# Patient Record
Sex: FEMALE | Race: WHITE | NOT HISPANIC OR LATINO | Employment: FULL TIME | URBAN - METROPOLITAN AREA
[De-identification: names, ages, dates, MRNs, and addresses within clinical notes are randomized per-mention and may not be internally consistent; named-entity substitution may affect disease eponyms.]

---

## 2017-08-14 ENCOUNTER — OFFICE VISIT (OUTPATIENT)
Dept: URGENT CARE | Facility: PHYSICIAN GROUP | Age: 62
End: 2017-08-14

## 2017-08-14 ENCOUNTER — HOSPITAL ENCOUNTER (OUTPATIENT)
Dept: RADIOLOGY | Facility: MEDICAL CENTER | Age: 62
End: 2017-08-14
Attending: PHYSICIAN ASSISTANT
Payer: COMMERCIAL

## 2017-08-14 VITALS
OXYGEN SATURATION: 95 % | WEIGHT: 150 LBS | RESPIRATION RATE: 18 BRPM | HEART RATE: 81 BPM | TEMPERATURE: 99.5 F | BODY MASS INDEX: 24.99 KG/M2 | SYSTOLIC BLOOD PRESSURE: 100 MMHG | HEIGHT: 65 IN | DIASTOLIC BLOOD PRESSURE: 52 MMHG

## 2017-08-14 DIAGNOSIS — M25.552 LEFT HIP PAIN: ICD-10-CM

## 2017-08-14 PROCEDURE — 73502 X-RAY EXAM HIP UNI 2-3 VIEWS: CPT | Mod: LT

## 2017-08-14 PROCEDURE — 99203 OFFICE O/P NEW LOW 30 MIN: CPT | Performed by: PHYSICIAN ASSISTANT

## 2017-08-14 RX ORDER — METHYLPREDNISOLONE 4 MG/1
TABLET ORAL
Qty: 1 KIT | Refills: 0 | Status: SHIPPED | OUTPATIENT
Start: 2017-08-14 | End: 2018-04-06

## 2017-08-14 NOTE — MR AVS SNAPSHOT
"        Flaca Menard   2017 2:45 PM   Office Visit   MRN: [id number]    Department:  [facility]   Dept Phone:  [phone]    Description:  Female : 1955   Provider:  Lula Borges PA-C           Reason for Visit     Hip Pain left hip pain x2 days, unsure of injury      Allergies as of 2017     No Known Allergies      You were diagnosed with     Left hip pain   [[id number]]         Vital Signs     Blood Pressure Pulse Temperature Respirations Height Weight    100/52 mmHg 81 37.5 °C (99.5 °F) 18 1.651 m (5' 5\") 68.04 kg (150 lb)    Body Mass Index Oxygen Saturation Smoking Status             24.96 kg/m2 95% Current Every Day Smoker         Basic Information     Date Of Birth Sex Race Ethnicity Preferred Language    1955 Female White Non- English      Problem List              ICD-10-CM Priority Class Noted - Resolved    HTN (hypertension) I10   2013 - Present    Estrogen deficiency E28.39   2013 - Present    DJD (degenerative joint disease)- right thumb mcp M19.90   2013 - Present    Nicotine dependence F17.200   2013 - Present    Hypothyroid E03.9   2013 - Present    Vitamin D deficiency disease E55.9   2013 - Present    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 Review all of your home medications and newly ordered medications " with your provider and/or pharmacist. Follow medication instructions as directed by your provider and/or pharmacist. Please keep your medication list with you and share with your provider. Update the information when medications are discontinued, doses are changed, or new medications (including over-the-counter products) are added; and carry medication information at all times in the event of emergency situations     Allergies:  No Known Allergies          Medications  Valid as of: August 14, 2017 -  4:25 PM    Generic Name Brand Name Tablet Size Instructions for use    Cholecalciferol (Cap) Vitamin D 2000 UNITS Take 1 Cap by mouth every day.        Estradiol (Tab) ESTRACE 1 MG Take 1 Tab by mouth every day.        Levothyroxine Sodium (Tab) SYNTHROID 125 MCG Take 1 Tab by mouth every day.        Lisinopril (Tab) PRINIVIL 10 MG Take 1 Tab by mouth every day.        Meloxicam (Tab) MOBIC 15 MG Take 1 Tab by mouth every day.        MethylPREDNISolone (Tablet Therapy Pack) MEDROL DOSEPAK 4 MG Take as directed        .                 Medicines prescribed today were sent to:     [facility]Y #[id number] - [place], [place] - [address] [place] [id number]    Phone: [phone] Fax: [phone]    Open 24 Hours?: No      Medication refill instructions:       If your prescription bottle indicates you have medication refills left, it is not necessary to call your provider’s office. Please contact your pharmacy and they will refill your medication.    If your prescription bottle indicates you do not have any refills left, you may request refills at any time through one of the following ways: The online [employer] system (except Urgent Care), by calling your provider’s office, or by asking your pharmacy to contact your provider’s office with a refill request. Medication refills are processed only during regular business hours and may not be available until the next business day. Your  provider may request additional information or to have a follow-up visit with you prior to refilling your medication.   *Please Note: Medication refills are assigned a new Rx number when refilled electronically. Your pharmacy may indicate that no refills were authorized even though a new prescription for the same medication is available at the pharmacy. Please request the medicine by name with the pharmacy before contacting your provider for a refill.        Your To Do List     Future Labs/Procedures Complete By Expires    DX-HIP-COMPLETE - UNILATERAL 2+ LEFT  As directed 8/14/2018      Referral     A referral request has been sent to our patient care coordination department. Please allow 3-5 business days for us to process this request and contact you either by phone or mail. If you do not hear from us by the 5th business day, please call us at [phone].           Relead Access Code: Activation code not generated  Current Relead Status: Active          Quit Tobacco Information     Do you want to quit using tobacco?    Quitting tobacco decreases risks of cancer, heart and lung disease, increases life expectancy, improves sense of taste and smell, and increases spending money, among other benefits.    If you are thinking about quitting, we can help.  • [facility] Quit Tobacco Program: [phone]  o Program occurs weekly for four weeks and includes pharmacist consultation on products to support quitting smoking or chewing tobacco. A provider referral is needed for pharmacist consultation.  • Tobacco Users Help Hotline: 1-286-QUIT-NOW (795-0428) or https://[place].quitlogix.org/  o Free, confidential telephone and online coaching for [place] residents. Sessions are designed on a schedule that is convenient for you. Eligible clients receive free nicotine replacement therapy.  • Nationally: www.smokefree.gov  o Information and professional assistance to support both immediate and long-term needs as you become, and  remain, a non-smoker. Smokefree.gov allows you to choose the help that best fits your needs.

## 2017-08-16 ASSESSMENT — ENCOUNTER SYMPTOMS
DIARRHEA: 0
NUMBNESS: 0
VOMITING: 0
SHORTNESS OF BREATH: 0
HEADACHES: 0
CHILLS: 0
WEAKNESS: 0
DIZZINESS: 0
ABDOMINAL PAIN: 0
FEVER: 0
HIP PAIN: 1
NAUSEA: 0
TINGLING: 0

## 2017-08-16 NOTE — PROGRESS NOTES
"Subjective:      Flaca Menard is a 62 y.o. female who presents with Hip Pain            Hip Pain  This is a new problem. The current episode started in the past 7 days (2 days). The problem occurs constantly. The problem has been waxing and waning. Pertinent negatives include no abdominal pain, chest pain, chills, fever, headaches, nausea, numbness, vomiting or weakness. Exacerbated by: weight bearing. She has tried NSAIDs for the symptoms. The treatment provided mild relief.     Patient reports left hip pain that started 2 days ago after walking her dog around the neighborhood. No know injuries or falls. Denies any previous history of left hip pain.     Review of Systems   Constitutional: Negative for fever and chills.   Respiratory: Negative for shortness of breath.    Cardiovascular: Negative for chest pain.   Gastrointestinal: Negative for nausea, vomiting, abdominal pain and diarrhea.   Genitourinary: Negative.    Musculoskeletal:        Positive for left hip pain   Neurological: Negative for dizziness, tingling, weakness, numbness and headaches.          Objective:     /52 mmHg  Pulse 81  Temp(Src) 37.5 °C (99.5 °F)  Resp 18  Ht 1.651 m (5' 5\")  Wt 68.04 kg (150 lb)  BMI 24.96 kg/m2  SpO2 95%     Physical Exam   Constitutional: She is oriented to person, place, and time. She appears well-developed and well-nourished. No distress.   HENT:   Head: Normocephalic and atraumatic.   Eyes: Pupils are equal, round, and reactive to light.   Neck: Normal range of motion.   Cardiovascular: Normal rate.    Pulmonary/Chest: Effort normal.   Musculoskeletal:        Left hip: She exhibits decreased range of motion. She exhibits normal strength, no tenderness and no swelling.   Antalgic gait favoring left lower extremity.    Neurological: She is alert and oriented to person, place, and time.   Skin: Skin is warm and dry. She is not diaphoretic.   Psychiatric: She has a normal mood and affect. Her behavior is " normal.   Nursing note and vitals reviewed.         PMH:  has a past medical history of Hypertension and Hypothyroidism.  MEDS:   Current outpatient prescriptions:   •  MethylPREDNISolone (MEDROL DOSEPAK) 4 MG Tablet Therapy Pack, Take as directed, Disp: 1 Kit, Rfl: 0  •  Cholecalciferol (VITAMIN D) 2000 UNITS CAPS, Take 1 Cap by mouth every day., Disp: 111 Cap, Rfl: 11  •  lisinopril (PRINIVIL) 10 MG TABS, Take 1 Tab by mouth every day., Disp: 90 Tab, Rfl: 3  •  levothyroxine (SYNTHROID) 125 MCG TABS, Take 1 Tab by mouth every day., Disp: 90 Tab, Rfl: 3  •  estradiol (ESTRACE) 1 MG TABS, Take 1 Tab by mouth every day., Disp: 90 Tab, Rfl: 3  •  meloxicam (MOBIC) 15 MG tablet, Take 1 Tab by mouth every day., Disp: 90 Tab, Rfl: 3  ALLERGIES: No Known Allergies  SURGHX:   Past Surgical History   Procedure Laterality Date   • Abdominal hysterectomy total  1992     endometriosis   • Other       birthmark removal     SOCHX:  reports that she has been smoking.  She has never used smokeless tobacco. She reports that she drinks alcohol. She reports that she does not use illicit drugs.  FH: family history includes Arthritis in her father; Diabetes in her maternal uncle and mother.       Assessment/Plan:     1. Left hip ronny  - DX-HIP-COMPLETE - UNILATERAL 2+ LEFT; Future  Impression        1.  There is mild superior hip joint space narrowing bilaterally.            - MethylPREDNISolone (MEDROL DOSEPAK) 4 MG Tablet Therapy Pack; Take as directed  Dispense: 1 Kit; Refill: 0  - REFERRAL TO ORTHOPEDICS    Encouraged frequent icing/heating 3-4 times daily. OTC Tylenol white taking oral steroids, discussed avoiding nsaids due to increased risk of GI bleed. Patient given crutches in the clinic to use as needed. She will follow up with ortho if symptoms persist/worse. The patient demonstrated a good understanding and agreed with the treatment plan.

## 2018-01-27 ENCOUNTER — NON-PROVIDER VISIT (OUTPATIENT)
Dept: URGENT CARE | Facility: PHYSICIAN GROUP | Age: 63
End: 2018-01-27
Payer: COMMERCIAL

## 2018-01-27 DIAGNOSIS — Z23 FLU VACCINE NEED: ICD-10-CM

## 2018-01-27 PROCEDURE — 90471 IMMUNIZATION ADMIN: CPT | Performed by: PHYSICIAN ASSISTANT

## 2018-01-27 PROCEDURE — 90686 IIV4 VACC NO PRSV 0.5 ML IM: CPT | Performed by: PHYSICIAN ASSISTANT

## 2018-01-27 NOTE — NON-PROVIDER
"Flaca Menard is a 62 y.o. female here for a non-provider visit for:   FLU    Reason for immunization: Annual Flu Vaccine  Immunization records indicate need for vaccine: Yes, confirmed with Epic  Minimum interval has been met for this vaccine: Yes  ABN completed: Not Indicated    Order and dose verified by: HARISH VIVEROS Dated  [id number] was given to patient: Yes  All IAC Questionnaire questions were answered \"No.\"    Patient tolerated injection and no adverse effects were observed or reported: Yes    Pt scheduled for next dose in series: Not Indicated    "

## 2018-04-06 ENCOUNTER — OFFICE VISIT (OUTPATIENT)
Dept: MEDICAL GROUP | Facility: PHYSICIAN GROUP | Age: 63
End: 2018-04-06
Payer: COMMERCIAL

## 2018-04-06 VITALS
WEIGHT: 138 LBS | DIASTOLIC BLOOD PRESSURE: 64 MMHG | BODY MASS INDEX: 22.99 KG/M2 | HEIGHT: 65 IN | SYSTOLIC BLOOD PRESSURE: 108 MMHG | OXYGEN SATURATION: 98 % | TEMPERATURE: 97.9 F | HEART RATE: 74 BPM | RESPIRATION RATE: 14 BRPM

## 2018-04-06 DIAGNOSIS — H61.21 IMPACTED CERUMEN OF RIGHT EAR: ICD-10-CM

## 2018-04-06 DIAGNOSIS — E03.9 HYPOTHYROIDISM, UNSPECIFIED TYPE: ICD-10-CM

## 2018-04-06 DIAGNOSIS — Z12.39 SCREENING FOR BREAST CANCER: ICD-10-CM

## 2018-04-06 DIAGNOSIS — Z79.890 HORMONE REPLACEMENT THERAPY: ICD-10-CM

## 2018-04-06 DIAGNOSIS — Z00.00 ROUTINE ADULT HEALTH MAINTENANCE: ICD-10-CM

## 2018-04-06 DIAGNOSIS — Z23 NEED FOR VACCINATION: ICD-10-CM

## 2018-04-06 PROBLEM — H92.01 RIGHT EAR PAIN: Status: ACTIVE | Noted: 2018-04-06

## 2018-04-06 PROCEDURE — 99204 OFFICE O/P NEW MOD 45 MIN: CPT | Mod: 25 | Performed by: INTERNAL MEDICINE

## 2018-04-06 PROCEDURE — 90715 TDAP VACCINE 7 YRS/> IM: CPT | Performed by: INTERNAL MEDICINE

## 2018-04-06 PROCEDURE — 90471 IMMUNIZATION ADMIN: CPT | Performed by: INTERNAL MEDICINE

## 2018-04-06 PROCEDURE — 90472 IMMUNIZATION ADMIN EACH ADD: CPT | Performed by: INTERNAL MEDICINE

## 2018-04-06 PROCEDURE — 90670 PCV13 VACCINE IM: CPT | Performed by: INTERNAL MEDICINE

## 2018-04-06 RX ORDER — CYCLOBENZAPRINE HCL 10 MG
10 TABLET ORAL 3 TIMES DAILY PRN
COMMUNITY
End: 2018-05-17 | Stop reason: SDUPTHER

## 2018-04-06 ASSESSMENT — PATIENT HEALTH QUESTIONNAIRE - PHQ9: CLINICAL INTERPRETATION OF PHQ2 SCORE: 0

## 2018-04-06 NOTE — PROGRESS NOTES
PRIMARY CARE CLINIC NEW PATIENT H&P  Chief Complaint   Patient presents with   • Orders Needed     Mammogram    • Labs Only     req Thyroid/Glucose    • Ear Pain     Rt side      History of Present Illness     HTN (hypertension)  Controlled on lisinopril 10 mg daily.     Hormone replacement therapy  Has history of hysterectomy (for endometriosis) in [place] and was started on estradiol 1 mg.     Hypothyroid  Denies history of radiation or surgery. Was tired in the early 1970s and has been on levothyroxine since. She has been feeling fatigue but [place] a [place].     Impacted cerumen of right ear  Has been waking up feeling fullness of her right ear due to wax build up.     Current Outpatient Prescriptions   Medication Sig Dispense Refill   • Cholecalciferol (VITAMIN D) 2000 UNITS CAPS Take 1 Cap by mouth every day. 111 Cap 11   • lisinopril (PRINIVIL) 10 MG TABS Take 1 Tab by mouth every day. 90 Tab 3   • levothyroxine (SYNTHROID) 125 MCG TABS Take 1 Tab by mouth every day. 90 Tab 3   • estradiol (ESTRACE) 1 MG TABS Take 1 Tab by mouth every day. 90 Tab 3   • cyclobenzaprine (FLEXERIL) 10 MG Tab Take 10 mg by mouth 3 times a day as needed.       No current facility-administered medications for this visit.        Past Medical History:   Diagnosis Date   • Hormone replacement therapy 4/6/2018   • HTN (hypertension) 12/23/2013   • Hypothyroidism      Past Surgical History:   Procedure Laterality Date   • APPENDECTOMY  10/2015   • ABDOMINAL HYSTERECTOMY TOTAL  1992    endometriosis   • OTHER      birthmark removal     Social History   Substance Use Topics   • Smoking status: Current Every Day Smoker     Packs/day: 0.25     Years: 30.00   • Smokeless tobacco: Never Used      Comment: working on cutting down smoking    • Alcohol use Yes      Comment: rarely     Social History     Social History Narrative    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    ROS  Constitutional: Positive for fatigue  HEENT: Negative for  vision changes, hearing changes    Respiratory: Negative for shortness of breath  Cardiovascular: Negative for chest pain, palpitations  Gastrointestinal: Negative for blood in stool, constipation, diarrhea  Genitourinary: Negative for dysuria, polyuria  Musculoskeletal: Negative for myalgias, back pain, and joint pain.   Skin: Negative for rash  Neurological: Negative for numbness, tingling  Psychiatric/Behavioral: Negative for depression, anxiety     Objective   Blood pressure 108/64, pulse 74, temperature 36.6 °C (97.9 °F), resp. rate 14, height 1.651 m (5' 5\"), weight 62.6 kg (138 lb), SpO2 98 %. Body mass index is 22.96 kg/m².    General: Alert, oriented. In no acute distress   HEET: EOMI, PERRL, conjunctiva non-injected, sclera non-icteric.  Nares patent with no significant congestion or drainage.  Stella pinnae, external auditory canals, some cerumen in left ear and cerumen impaction of right ear.Oral mucous membranes pink and moist with no lesions.  Neck: supple with no cervical, subclavicular lymphadenopathy, JVD, palpable thyroid nodules   Lungs: clear to auscultation bilaterally with good excursion.  CV: regular rate and rhythm.  Abdomen soft, non-distended, non-tender with normal bowel sounds. No hepatosplenomegaly, no masses palpated  Skin: no lesions. Warm, dry   Psychiatric: appropriate mood and affect       Assessment and Plan   The following treatment plan was discussed     1. Screening for breast cancer  Due for screening mammogram, ordered.   - MA-SCREEN MAMMO W/CAD-BILAT; Future    2. Hormone replacement therapy  Has history of total hysterectomy in [place] and has been on estradiol from her " gynecologist there since.     3. Hypothyroidism, unspecified type  Has been feeling fatigue, will check TSH to ensure her levothyroxine is at the proper dosage.   - TSH WITH REFLEX TO FT4; Future    4. Need for vaccination  Given today.   - Prevnar 13 PCV-13  - TDAP VACCINE =>8YO IM    5. Routine adult health maintenance  Due for fasting labs, ordered.   - COMP METABOLIC PANEL; Future  - CBC WITH DIFFERENTIAL; Future  - LIPID PROFILE; Future  - VITAMIN D,25 HYDROXY; Future    6. Impacted cerumen of right ear  - EAR IRRIGATION (MA ONLY); Future      Return in about 8 months (around 12/6/2018).    Health Maintenance      Health Maintenance Due   Topic Date Due   • MAMMOGRAM  01/06/2015   • IMM ZOSTER VACCINE  03/14/2016     Levi Gonzalez MD  Internal Medicine  [facility]

## 2018-04-06 NOTE — ASSESSMENT & PLAN NOTE
Denies history of radiation or surgery. Was tired in the early 1970s and has been on levothyroxine since. She has been feeling fatigue but does take her dog to the [place] for walks twice a day.

## 2018-04-11 ENCOUNTER — HOSPITAL ENCOUNTER (OUTPATIENT)
Dept: RADIOLOGY | Facility: MEDICAL CENTER | Age: 63
End: 2018-04-11
Attending: INTERNAL MEDICINE
Payer: COMMERCIAL

## 2018-04-11 DIAGNOSIS — Z12.39 SCREENING FOR BREAST CANCER: ICD-10-CM

## 2018-04-11 PROCEDURE — 77067 SCR MAMMO BI INCL CAD: CPT

## 2018-04-16 ENCOUNTER — HOSPITAL ENCOUNTER (OUTPATIENT)
Dept: LAB | Facility: MEDICAL CENTER | Age: 63
End: 2018-04-16
Attending: INTERNAL MEDICINE
Payer: COMMERCIAL

## 2018-04-16 DIAGNOSIS — E03.9 HYPOTHYROIDISM, UNSPECIFIED TYPE: ICD-10-CM

## 2018-04-16 DIAGNOSIS — Z00.00 ROUTINE ADULT HEALTH MAINTENANCE: ICD-10-CM

## 2018-04-16 LAB
25(OH)D3 SERPL-MCNC: 21 NG/ML (ref 30–100)
ALBUMIN SERPL BCP-MCNC: 3.8 G/DL (ref 3.2–4.9)
ALBUMIN/GLOB SERPL: 1.2 G/DL
ALP SERPL-CCNC: 61 U/L (ref 30–99)
ALT SERPL-CCNC: 9 U/L (ref 2–50)
ANION GAP SERPL CALC-SCNC: 7 MMOL/L (ref 0–11.9)
AST SERPL-CCNC: 16 U/L (ref 12–45)
BASOPHILS # BLD AUTO: 1.4 % (ref 0–1.8)
BASOPHILS # BLD: 0.08 K/UL (ref 0–0.12)
BILIRUB SERPL-MCNC: 0.5 MG/DL (ref 0.1–1.5)
BUN SERPL-MCNC: 15 MG/DL (ref 8–22)
CALCIUM SERPL-MCNC: 9.3 MG/DL (ref 8.5–10.5)
CHLORIDE SERPL-SCNC: 103 MMOL/L (ref 96–112)
CHOLEST SERPL-MCNC: 165 MG/DL (ref 100–199)
CO2 SERPL-SCNC: 26 MMOL/L (ref 20–33)
CREAT SERPL-MCNC: 0.96 MG/DL (ref 0.5–1.4)
EOSINOPHIL # BLD AUTO: 0.2 K/UL (ref 0–0.51)
EOSINOPHIL NFR BLD: 3.5 % (ref 0–6.9)
ERYTHROCYTE [DISTWIDTH] IN BLOOD BY AUTOMATED COUNT: 43.2 FL (ref 35.9–50)
GLOBULIN SER CALC-MCNC: 3.2 G/DL (ref 1.9–3.5)
GLUCOSE SERPL-MCNC: 83 MG/DL (ref 65–99)
HCT VFR BLD AUTO: 42.2 % (ref 37–47)
HDLC SERPL-MCNC: 60 MG/DL
HGB BLD-MCNC: 14 G/DL (ref 12–16)
IMM GRANULOCYTES # BLD AUTO: 0.02 K/UL (ref 0–0.11)
IMM GRANULOCYTES NFR BLD AUTO: 0.3 % (ref 0–0.9)
LDLC SERPL CALC-MCNC: 83 MG/DL
LYMPHOCYTES # BLD AUTO: 1.91 K/UL (ref 1–4.8)
LYMPHOCYTES NFR BLD: 33.2 % (ref 22–41)
MCH RBC QN AUTO: 30.4 PG (ref 27–33)
MCHC RBC AUTO-ENTMCNC: 33.2 G/DL (ref 33.6–35)
MCV RBC AUTO: 91.7 FL (ref 81.4–97.8)
MONOCYTES # BLD AUTO: 0.57 K/UL (ref 0–0.85)
MONOCYTES NFR BLD AUTO: 9.9 % (ref 0–13.4)
NEUTROPHILS # BLD AUTO: 2.98 K/UL (ref 2–7.15)
NEUTROPHILS NFR BLD: 51.7 % (ref 44–72)
NRBC # BLD AUTO: 0 K/UL
NRBC BLD-RTO: 0 /100 WBC
PLATELET # BLD AUTO: 170 K/UL (ref 164–446)
PMV BLD AUTO: 11.7 FL (ref 9–12.9)
POTASSIUM SERPL-SCNC: 4.3 MMOL/L (ref 3.6–5.5)
PROT SERPL-MCNC: 7 G/DL (ref 6–8.2)
RBC # BLD AUTO: 4.6 M/UL (ref 4.2–5.4)
SODIUM SERPL-SCNC: 136 MMOL/L (ref 135–145)
TRIGL SERPL-MCNC: 110 MG/DL (ref 0–149)
TSH SERPL DL<=0.005 MIU/L-ACNC: 1.74 UIU/ML (ref 0.38–5.33)
WBC # BLD AUTO: 5.8 K/UL (ref 4.8–10.8)

## 2018-04-16 PROCEDURE — 82306 VITAMIN D 25 HYDROXY: CPT

## 2018-04-16 PROCEDURE — 80053 COMPREHEN METABOLIC PANEL: CPT

## 2018-04-16 PROCEDURE — 85025 COMPLETE CBC W/AUTO DIFF WBC: CPT

## 2018-04-16 PROCEDURE — 84443 ASSAY THYROID STIM HORMONE: CPT

## 2018-04-16 PROCEDURE — 36415 COLL VENOUS BLD VENIPUNCTURE: CPT

## 2018-04-16 PROCEDURE — 80061 LIPID PANEL: CPT

## 2018-05-17 ENCOUNTER — TELEPHONE (OUTPATIENT)
Dept: MEDICAL GROUP | Facility: PHYSICIAN GROUP | Age: 63
End: 2018-05-17

## 2018-05-17 DIAGNOSIS — M19.90 OSTEOARTHRITIS, UNSPECIFIED OSTEOARTHRITIS TYPE, UNSPECIFIED SITE: ICD-10-CM

## 2018-05-17 DIAGNOSIS — E03.9 HYPOTHYROIDISM, UNSPECIFIED TYPE: ICD-10-CM

## 2018-05-17 DIAGNOSIS — E28.39 ESTROGEN DEFICIENCY: ICD-10-CM

## 2018-05-17 DIAGNOSIS — I10 ESSENTIAL HYPERTENSION: ICD-10-CM

## 2018-05-17 RX ORDER — LEVOTHYROXINE SODIUM 0.12 MG/1
125 TABLET ORAL DAILY
Qty: 90 TAB | Refills: 3 | Status: SHIPPED | OUTPATIENT
Start: 2018-05-17 | End: 2019-03-18 | Stop reason: SDUPTHER

## 2018-05-17 RX ORDER — CYCLOBENZAPRINE HCL 10 MG
10 TABLET ORAL 3 TIMES DAILY PRN
Qty: 30 TAB | Refills: 0 | Status: SHIPPED | OUTPATIENT
Start: 2018-05-17 | End: 2020-03-31 | Stop reason: SDUPTHER

## 2018-05-17 RX ORDER — ESTRADIOL 1 MG/1
1 TABLET ORAL DAILY
Qty: 90 TAB | Refills: 3 | Status: SHIPPED | OUTPATIENT
Start: 2018-05-17 | End: 2019-02-26 | Stop reason: SDUPTHER

## 2018-05-17 RX ORDER — LISINOPRIL 10 MG/1
10 TABLET ORAL DAILY
Qty: 90 TAB | Refills: 3 | Status: SHIPPED | OUTPATIENT
Start: 2018-05-17 | End: 2019-02-26 | Stop reason: SDUPTHER

## 2018-05-17 NOTE — TELEPHONE ENCOUNTER
----- Message from Sindhu Adrian sent at 5/17/2018  3:08 PM PDT -----  Contact: [phone]  Patient came in stated her medication was not called in for the year, her Estradiol-1 mg, Levothyroxine - 125 mg, Lisinopril- 10 mg and her Cyclobenzaprine 10 Mg 30 tablets only. [facility] [address]. If question please call Thank you

## 2018-05-17 NOTE — TELEPHONE ENCOUNTER
----- Message from Sindhu Adrian sent at 5/17/2018  3:08 PM PDT -----  Contact: [phone]  Patient came in stated her medication was not called in for the year, her Estradiol-1 mg, Levothyroxine - 125 mg, Lisinopril- 10 mg and her Cyclobenzaprine 10 Mg 30 tablets only. [facility] [address]. If question please call Thank you

## 2019-02-26 DIAGNOSIS — E28.39 ESTROGEN DEFICIENCY: ICD-10-CM

## 2019-02-26 DIAGNOSIS — I10 ESSENTIAL HYPERTENSION: ICD-10-CM

## 2019-02-27 RX ORDER — ESTRADIOL 1 MG/1
1 TABLET ORAL DAILY
Qty: 90 TAB | Refills: 0 | Status: SHIPPED | OUTPATIENT
Start: 2019-02-27 | End: 2019-08-04 | Stop reason: SDUPTHER

## 2019-02-27 RX ORDER — LISINOPRIL 10 MG/1
10 TABLET ORAL DAILY
Qty: 90 TAB | Refills: 0 | Status: SHIPPED | OUTPATIENT
Start: 2019-02-27 | End: 2020-05-19 | Stop reason: SDUPTHER

## 2019-03-18 DIAGNOSIS — E03.9 HYPOTHYROIDISM, UNSPECIFIED TYPE: ICD-10-CM

## 2019-03-18 NOTE — TELEPHONE ENCOUNTER
*90 DAYS SUPPLY REQUEST*  Was the patient seen in the last year in this department? Yes    Does patient have an active prescription for medications requested? No     Received Request Via: Pharmacy      Pt met protocol?: Yes    LAST OV 04/06/2018      Lab Results  Component Value Date/Time   TSHULTRASEN 1.740 04/16/2018 0602

## 2019-03-19 RX ORDER — LEVOTHYROXINE SODIUM 0.12 MG/1
125 TABLET ORAL DAILY
Qty: 90 TAB | Refills: 1 | Status: SHIPPED | OUTPATIENT
Start: 2019-03-19 | End: 2020-05-19 | Stop reason: SDUPTHER

## 2019-11-11 ENCOUNTER — NON-PROVIDER VISIT (OUTPATIENT)
Dept: URGENT CARE | Facility: PHYSICIAN GROUP | Age: 64
End: 2019-11-11
Payer: COMMERCIAL

## 2019-11-11 DIAGNOSIS — Z23 NEED FOR VACCINATION: ICD-10-CM

## 2019-11-11 PROCEDURE — 90471 IMMUNIZATION ADMIN: CPT | Performed by: FAMILY MEDICINE

## 2019-11-11 PROCEDURE — 90686 IIV4 VACC NO PRSV 0.5 ML IM: CPT | Performed by: FAMILY MEDICINE

## 2019-11-11 NOTE — NON-PROVIDER
"Flaca Menard is a 64 y.o. female here for a non-provider visit for:   FLU    Reason for immunization: Annual Flu Vaccine  Immunization records indicate need for vaccine: Yes, confirmed with Epic  Minimum interval has been met for this vaccine: Yes  ABN completed: Yes    Order and dose verified by: DOM  VIS Dated  08/15/19 was given to patient: No  All IAC Questionnaire questions were answered \"No.\"    Patient tolerated injection and no adverse effects were observed or reported: Yes    Pt scheduled for next dose in series: No    "

## 2019-11-23 ENCOUNTER — OFFICE VISIT (OUTPATIENT)
Dept: URGENT CARE | Facility: PHYSICIAN GROUP | Age: 64
End: 2019-11-23
Payer: COMMERCIAL

## 2019-11-23 VITALS
RESPIRATION RATE: 16 BRPM | OXYGEN SATURATION: 97 % | TEMPERATURE: 98.7 F | DIASTOLIC BLOOD PRESSURE: 80 MMHG | SYSTOLIC BLOOD PRESSURE: 122 MMHG | HEIGHT: 65 IN | BODY MASS INDEX: 25.99 KG/M2 | WEIGHT: 156 LBS | HEART RATE: 67 BPM

## 2019-11-23 DIAGNOSIS — H00.011 HORDEOLUM EXTERNUM OF RIGHT UPPER EYELID: ICD-10-CM

## 2019-11-23 PROCEDURE — 99214 OFFICE O/P EST MOD 30 MIN: CPT | Performed by: PHYSICIAN ASSISTANT

## 2019-11-23 RX ORDER — ERYTHROMYCIN 5 MG/G
1 OINTMENT OPHTHALMIC 4 TIMES DAILY
Qty: 1 TUBE | Refills: 0 | Status: SHIPPED | OUTPATIENT
Start: 2019-11-23 | End: 2019-12-03

## 2019-11-23 ASSESSMENT — ENCOUNTER SYMPTOMS
EYE DISCHARGE: 0
PALPITATIONS: 0
PHOTOPHOBIA: 0
EYE PAIN: 1
SHORTNESS OF BREATH: 0
CHILLS: 0
SORE THROAT: 0
BLURRED VISION: 0
FEVER: 0
EYE REDNESS: 0
COUGH: 0
HEADACHES: 0
SINUS PAIN: 0
DOUBLE VISION: 0

## 2019-11-23 NOTE — PROGRESS NOTES
Subjective:      Flaca Menard is a 64 y.o. female who presents with Eye Problem (right eye soreness, poss stye x 1 week)            Eye Problem    The right eye is affected. This is a new problem. The current episode started in the past 7 days. The problem occurs constantly. There was no injury mechanism. There is no known exposure to pink eye. She does not wear contacts. Pertinent negatives include no blurred vision, eye discharge, double vision, eye redness, fever or photophobia.       Review of Systems   Constitutional: Negative for chills, fever and malaise/fatigue.   HENT: Negative for congestion, ear pain, sinus pain and sore throat.    Eyes: Positive for pain. Negative for blurred vision, double vision, photophobia, discharge and redness.   Respiratory: Negative for cough and shortness of breath.    Cardiovascular: Negative for chest pain and palpitations.   Skin: Negative for rash.   Neurological: Negative for headaches.   All other systems reviewed and are negative.    PMH:  has a past medical history of Hormone replacement therapy (4/6/2018), HTN (hypertension) (12/23/2013), and Hypothyroidism.  MEDS:   Current Outpatient Medications:   •  erythromycin 5 MG/GM Ointment, Place 1 cm in both eyes 4 times a day for 10 days. Apply 1 cm ribbon underneath affected lower eyelid(s) 4 times daily for 10 days., Disp: 1 Tube, Rfl: 0  •  estradiol (ESTRACE) 1 MG Tab, TAKE 1 TABLET BY MOUTH EVERY DAY, Disp: 90 Tab, Rfl: 0  •  levothyroxine (SYNTHROID) 125 MCG Tab, Take 1 Tab by mouth every day., Disp: 90 Tab, Rfl: 1  •  lisinopril (PRINIVIL) 10 MG Tab, Take 1 Tab by mouth every day., Disp: 90 Tab, Rfl: 0  •  cyclobenzaprine (FLEXERIL) 10 MG Tab, Take 1 Tab by mouth 3 times a day as needed., Disp: 30 Tab, Rfl: 0  •  Cholecalciferol (VITAMIN D) 2000 UNITS CAPS, Take 1 Cap by mouth every day., Disp: 111 Cap, Rfl: 11  ALLERGIES: No Known Allergies  SURGHX:   Past Surgical History:   Procedure Laterality Date   •  "APPENDECTOMY  10/2015   • ABDOMINAL HYSTERECTOMY TOTAL  1992    endometriosis   • OTHER      birthmark removal     SOCHX:  reports that she has been smoking. She has a 7.50 pack-year smoking history. She has never used smokeless tobacco. She reports current alcohol use. She reports that she does not use drugs.  FH: Family history was reviewed, no pertinent findings to report  Medications, Allergies, and current problem list reviewed today in Epic       Objective:     Blood Pressure 122/80   Pulse 67   Temperature 37.1 °C (98.7 °F) (Temporal)   Respiration 16   Height 1.651 m (5' 5\")   Weight 70.8 kg (156 lb)   Oxygen Saturation 97%   Body Mass Index 25.96 kg/m²      Physical Exam  Vitals signs reviewed.   Constitutional:       General: She is not in acute distress.     Appearance: She is well-developed. She is not ill-appearing or toxic-appearing.   HENT:      Head: Normocephalic and atraumatic.      Right Ear: Hearing, tympanic membrane, ear canal and external ear normal.      Left Ear: Hearing, tympanic membrane, ear canal and external ear normal.      Nose: Nose normal.      Mouth/Throat:      Pharynx: Uvula midline. No oropharyngeal exudate.   Eyes:      General: Lids are normal. Lids are everted, no foreign bodies appreciated.         Right eye: Hordeolum present.      Pupils: Pupils are equal, round, and reactive to light.   Neck:      Musculoskeletal: Full passive range of motion without pain, normal range of motion and neck supple.   Cardiovascular:      Rate and Rhythm: Regular rhythm.      Heart sounds: Normal heart sounds. No murmur. No friction rub. No gallop.    Pulmonary:      Effort: Pulmonary effort is normal. No respiratory distress.      Breath sounds: Normal breath sounds. No decreased breath sounds, wheezing or rales.   Chest:      Chest wall: No tenderness.   Musculoskeletal: Normal range of motion.         General: No tenderness or deformity.   Skin:     General: Skin is warm and dry.    "   Findings: No rash.   Neurological:      Mental Status: She is alert and oriented to person, place, and time.   Psychiatric:         Speech: Speech normal.         Behavior: Behavior normal.         Thought Content: Thought content normal.         Judgment: Judgment normal.                 Assessment/Plan:       1. Hordeolum externum of right upper eyelid    - erythromycin 5 MG/GM Ointment; Place 1 cm in both eyes 4 times a day for 10 days. Apply 1 cm ribbon underneath affected lower eyelid(s) 4 times daily for 10 days.  Dispense: 1 Tube; Refill: 0    Differential diagnosis, natural history, supportive care discussed. Follow-up with primary care provider within 7-10 days, emergency room precautions discussed.  Patient and/or family appears understanding of information.  Handout and review of patients diagnosis and treatment was discussed extensively.

## 2020-03-31 ENCOUNTER — OFFICE VISIT (OUTPATIENT)
Dept: MEDICAL GROUP | Facility: MEDICAL CENTER | Age: 65
End: 2020-03-31
Payer: COMMERCIAL

## 2020-03-31 ENCOUNTER — APPOINTMENT (OUTPATIENT)
Dept: MEDICAL GROUP | Facility: MEDICAL CENTER | Age: 65
End: 2020-03-31
Payer: COMMERCIAL

## 2020-03-31 VITALS
SYSTOLIC BLOOD PRESSURE: 112 MMHG | HEIGHT: 65 IN | WEIGHT: 151.9 LBS | HEART RATE: 74 BPM | TEMPERATURE: 98.2 F | RESPIRATION RATE: 16 BRPM | DIASTOLIC BLOOD PRESSURE: 58 MMHG | OXYGEN SATURATION: 96 % | BODY MASS INDEX: 25.31 KG/M2

## 2020-03-31 DIAGNOSIS — R25.2 SPASM: ICD-10-CM

## 2020-03-31 DIAGNOSIS — Z00.00 PREVENTATIVE HEALTH CARE: ICD-10-CM

## 2020-03-31 DIAGNOSIS — Z11.59 ENCOUNTER FOR HEPATITIS C SCREENING TEST FOR LOW RISK PATIENT: ICD-10-CM

## 2020-03-31 DIAGNOSIS — Z12.31 ENCOUNTER FOR SCREENING MAMMOGRAM FOR MALIGNANT NEOPLASM OF BREAST: ICD-10-CM

## 2020-03-31 DIAGNOSIS — I10 ESSENTIAL HYPERTENSION: ICD-10-CM

## 2020-03-31 DIAGNOSIS — M19.90 OSTEOARTHRITIS, UNSPECIFIED OSTEOARTHRITIS TYPE, UNSPECIFIED SITE: ICD-10-CM

## 2020-03-31 DIAGNOSIS — E55.9 VITAMIN D DEFICIENCY DISEASE: ICD-10-CM

## 2020-03-31 DIAGNOSIS — Z12.11 SCREENING FOR COLON CANCER: ICD-10-CM

## 2020-03-31 DIAGNOSIS — E03.9 HYPOTHYROIDISM, UNSPECIFIED TYPE: ICD-10-CM

## 2020-03-31 DIAGNOSIS — R01.1 MURMUR, CARDIAC: ICD-10-CM

## 2020-03-31 DIAGNOSIS — F17.200 NICOTINE DEPENDENCE, UNCOMPLICATED, UNSPECIFIED NICOTINE PRODUCT TYPE: ICD-10-CM

## 2020-03-31 PROBLEM — H61.21 IMPACTED CERUMEN OF RIGHT EAR: Status: RESOLVED | Noted: 2018-04-06 | Resolved: 2020-03-31

## 2020-03-31 PROCEDURE — 99214 OFFICE O/P EST MOD 30 MIN: CPT | Performed by: INTERNAL MEDICINE

## 2020-03-31 RX ORDER — ESTRADIOL 0.5 MG/1
0.5 TABLET ORAL DAILY
Qty: 90 TAB | Refills: 3 | Status: SHIPPED | OUTPATIENT
Start: 2020-03-31 | End: 2021-03-08

## 2020-03-31 RX ORDER — CYCLOBENZAPRINE HCL 10 MG
10 TABLET ORAL 3 TIMES DAILY PRN
Qty: 30 TAB | Refills: 0 | Status: SHIPPED | OUTPATIENT
Start: 2020-03-31 | End: 2021-03-08 | Stop reason: SDUPTHER

## 2020-03-31 ASSESSMENT — PATIENT HEALTH QUESTIONNAIRE - PHQ9: CLINICAL INTERPRETATION OF PHQ2 SCORE: 0

## 2020-03-31 ASSESSMENT — FIBROSIS 4 INDEX: FIB4 SCORE: 2.01

## 2020-03-31 NOTE — PROGRESS NOTES
"CC:  Diagnoses of Hypothyroidism, unspecified type, Essential hypertension, Vitamin D deficiency disease, Encounter for screening mammogram for malignant neoplasm of breast, Preventative health care, Encounter for hepatitis C screening test for low risk patient, Osteoarthritis, unspecified osteoarthritis type, unspecified site, Spasm, Screening for colon cancer, and Nicotine dependence, uncomplicated, unspecified nicotine product type were pertinent to this visit.    HISTORY OF THE PRESENT ILLNESS: Patient is a 65 y.o. female. This pleasant patient is here today   to establish care.    Feels well overall, no acute health concerns.    On exam there was a systolic cardiac murmur, patient is not aware of this from before.  She denies any syncope, presyncope, chest pain, palpitations, dyspnea, leg edema, other symptoms.    History of hysterectomy 1993 and was placed on estradiol since then.  Currently on 1 mg dose and willing to wean down.  She says in the past she tried discontinuing and had very severe hot flashes affecting her quality of life.  She does understand that chronic estrogen use does have some potential small increased risk of developing breast cancer, stroke, other thrombotic events.  She has no gynecologic symptoms of concern.  Mammogram screening is overdue she denies any focal breast concerns.    Labs reviewed from 4/16/2018 low D of 21, GFR 59, TSH 1.740, normal CBC/CMP/lipids.    Smoking still, though has cut down to 5 to 6 cigarettes/day using about 1 pack/month instead of a pack every 2 weeks.  She is not ready to quit but will let me know when she does wish to have treatment advice.    Chart lists history of \"neoplasm of thumb \"this was formally treated by PCP Dr. Brigitte gay/ ange conteh, by history sounds like this is a wart of left thumb.  She occasionally files it.  Is not significantly changing.    Walks regularly at the [place].  Some occasional spasms in the legs at night she uses about 15 " Flexeril pills per year for this.  She does not eat much red meat, she was advised to ensure she has enough iron in her diet.    Patient reports last colonoscopy was in 2008 was due again in 10 years.  She does not wish to repeat colonoscopy.  She is agreeable to Cologuard.    Allergies: Patient has no known allergies.    Current Outpatient Medications Ordered in Epic   Medication Sig Dispense Refill   • estradiol (ESTRACE) 0.5 MG tablet Take 1 Tab by mouth every day. 90 Tab 3   • cyclobenzaprine (FLEXERIL) 10 MG Tab Take 1 Tab by mouth 3 times a day as needed. 30 Tab 0   • levothyroxine (SYNTHROID) 125 MCG Tab Take 1 Tab by mouth every day. 90 Tab 1   • lisinopril (PRINIVIL) 10 MG Tab Take 1 Tab by mouth every day. 90 Tab 0   • Cholecalciferol (VITAMIN D) 2000 UNITS CAPS Take 1 Cap by mouth every day. 111 Cap 11     No current [facility]-ordered facility-administered medications on file.        Past Medical History:   Diagnosis Date   • Hormone replacement therapy 4/6/2018   • HTN (hypertension) 12/23/2013   • Hypertension    • Hypothyroidism        Past Surgical History:   Procedure Laterality Date   • APPENDECTOMY  10/2015   • ABDOMINAL HYSTERECTOMY TOTAL  1992    endometriosis   • OTHER      birthmark removal       Social History     Tobacco Use   • Smoking status: Current Every Day Smoker     Packs/day: 0.25     Years: 30.00     Pack years: 7.50   • Smokeless tobacco: Never Used   • Tobacco comment: working on cutting down smoking    Substance Use Topics   • Alcohol use: Yes     Comment: rarely   • Drug use: No       Social History     Social History Narrative    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      - Cardiovascular: Negative for chest pain, palpitations, orthopnea, and bilateral lower extremity edema.     - Gastrointestinal: Negative for heartburn, nausea, vomiting, abdominal pain, hematochezia, melena, diarrhea, constipation, and greasy/foul-smelling stools.     - Genitourinary: Negative for dysuria     - Musculoskeletal: Negative for myalgias, back pain, and joint pain.     - Skin: Negative for rash, itching, cyanotic skin color change.     - Neurological: Negative for migraines, numbness, ataxia, tremors, vertigo    - Endo:Negative for polyuria, heat/cold intolerance, excessive thirst    - Hem/lymphatic: Negative for easy bruising, blood clots, lymphedema, swollen glands    -Allergic/immun: Negative for allergic rhinitis    - Psychiatric/Behavioral: Negative for depression, suicidal/homicidal ideation and memory loss.      Exam: /58 (BP Location: Right arm, Patient Position: Sitting, BP Cuff Size: Adult)   Pulse 74   Temp 36.8 °C (98.2 °F) (Temporal)   Resp 16   Ht 1.651 m (5' 5\")   Wt 68.9 kg (151 lb 14.4 oz)   SpO2 96%  Body mass index is 25.28 kg/m².    General: Normal appearing. No distress.  EYES: Conjunctiva clear lids without ptosis, pupils equal  EARS: Normal shape and contour   Pulmonary: Clear to ausculation.  Normal effort. No rales or wheezing.  Cardiovascular: Regular rate and rhythm, CA 2/6 best at LSB  Abdomen: Soft, nontender, nondistended. Normal bowel sounds.  Neurologic: Cranial nerves grossly nonfocal  Skin: Warm and dry.  No obvious lesions.  Musculoskeletal: Normal gait. No extremity cyanosis, clubbing, or edema. L thumb skin colored papule about 3-4mm.  Psych: Normal mood and affect. Alert and oriented x3. Judgment and insight is normal.      Assessment/Plan  1. Hypothyroidism, " unspecified type  Stable, chronic, continue levothyroxine.  - CBC WITH DIFFERENTIAL; Future  - Comp Metabolic Panel; Future  - TSH WITH REFLEX TO FT4; Future    2. Essential hypertension  Stable, chronic, continue lisinopril.  - CBC WITH DIFFERENTIAL; Future  - Comp Metabolic Panel; Future    3. Vitamin D deficiency disease  Stable, chronic, patient is on cholecalciferol, will reassess status  - VITAMIN D,25 HYDROXY; Future    4. Encounter for screening mammogram for malignant neoplasm of breast  Due for screening, asymptomatic.  Noted on estradiol post hysterectomy, plan to wean off.  - MA-SCREENING MAMMO BILAT W/TOMOSYNTHESIS W/CAD; Future    5. Preventative health care  - CBC WITH DIFFERENTIAL; Future  - Comp Metabolic Panel; Future  - Lipid Profile; Future  - TSH WITH REFLEX TO FT4; Future  - VITAMIN D,25 HYDROXY; Future  - MA-SCREENING MAMMO BILAT W/TOMOSYNTHESIS W/CAD; Future    6. Encounter for hepatitis C screening test for low risk patient  Patient agreeable as preventive health guideline given birth cohort.  - HEP C VIRUS ANTIBODY; Future    7. Osteoarthritis, unspecified osteoarthritis type, unspecified site  Stable, chronic reasonable to use as needed Flexeril.  - cyclobenzaprine (FLEXERIL) 10 MG Tab; Take 1 Tab by mouth 3 times a day as needed.  Dispense: 30 Tab; Refill: 0    8. Spasm  Indicates she only uses about 15 tabs of Flexeril per year for occasional leg spasms at night described as restlessness.  Advised to ensure she has adequate iron in diet.  Will check CBC.  - cyclobenzaprine (FLEXERIL) 10 MG Tab; Take 1 Tab by mouth 3 times a day as needed.  Dispense: 30 Tab; Refill: 0    9. Screening for colon cancer  Due for screening, she has no history of first-degree relatives colon cancer, current symptoms of prior abnormal colonoscopy per patient.  - COLOGUARD (FIT DNA)    10. Nicotine dependence, uncomplicated, unspecified nicotine product type  She will let me know when she is ready to quit.  We  will continue to reassess.    11.  Cardiac murmur  Systolic murmur on exam, asymptomatic.  Offered echocardiogram but together we decided w/recent coronavirus pandemic to hold off on any testing at this time as likely would not change any management.    12.  Wart of thumb  May require much more frequent liquid nitrogen treatments, at this time we plan to defer.  Journal of pediatrics has shown evidence that using duct tape to plantar warts in children is an acceptable treatment, advised patient she may use a small piece of duct tape to the thumb wart to see if this will resolve it. If not, she will let me know.    Patient prefers annual follow-up, sooner if needed        Please note that this dictation was created using voice recognition software. I have made every reasonable attempt to correct obvious errors, but I expect that there are errors of grammar and possibly content that I did not discover before finalizing the note.

## 2020-04-04 ENCOUNTER — HOSPITAL ENCOUNTER (OUTPATIENT)
Dept: LAB | Facility: MEDICAL CENTER | Age: 65
End: 2020-04-04
Attending: INTERNAL MEDICINE
Payer: COMMERCIAL

## 2020-04-04 DIAGNOSIS — Z11.59 ENCOUNTER FOR HEPATITIS C SCREENING TEST FOR LOW RISK PATIENT: ICD-10-CM

## 2020-04-04 DIAGNOSIS — E55.9 VITAMIN D DEFICIENCY DISEASE: ICD-10-CM

## 2020-04-04 DIAGNOSIS — I10 ESSENTIAL HYPERTENSION: ICD-10-CM

## 2020-04-04 DIAGNOSIS — E03.9 HYPOTHYROIDISM, UNSPECIFIED TYPE: ICD-10-CM

## 2020-04-04 DIAGNOSIS — Z00.00 PREVENTATIVE HEALTH CARE: ICD-10-CM

## 2020-04-04 LAB
25(OH)D3 SERPL-MCNC: 36 NG/ML (ref 30–100)
ALBUMIN SERPL BCP-MCNC: 4.4 G/DL (ref 3.2–4.9)
ALBUMIN/GLOB SERPL: 1.2 G/DL
ALP SERPL-CCNC: 85 U/L (ref 30–99)
ALT SERPL-CCNC: 14 U/L (ref 2–50)
ANION GAP SERPL CALC-SCNC: 11 MMOL/L (ref 7–16)
AST SERPL-CCNC: 22 U/L (ref 12–45)
BASOPHILS # BLD AUTO: 1.3 % (ref 0–1.8)
BASOPHILS # BLD: 0.08 K/UL (ref 0–0.12)
BILIRUB SERPL-MCNC: 0.5 MG/DL (ref 0.1–1.5)
BUN SERPL-MCNC: 16 MG/DL (ref 8–22)
CALCIUM SERPL-MCNC: 9.8 MG/DL (ref 8.5–10.5)
CHLORIDE SERPL-SCNC: 102 MMOL/L (ref 96–112)
CHOLEST SERPL-MCNC: 183 MG/DL (ref 100–199)
CO2 SERPL-SCNC: 23 MMOL/L (ref 20–33)
CREAT SERPL-MCNC: 0.92 MG/DL (ref 0.5–1.4)
EOSINOPHIL # BLD AUTO: 0.13 K/UL (ref 0–0.51)
EOSINOPHIL NFR BLD: 2.1 % (ref 0–6.9)
ERYTHROCYTE [DISTWIDTH] IN BLOOD BY AUTOMATED COUNT: 41.6 FL (ref 35.9–50)
GLOBULIN SER CALC-MCNC: 3.6 G/DL (ref 1.9–3.5)
GLUCOSE SERPL-MCNC: 92 MG/DL (ref 65–99)
HCT VFR BLD AUTO: 43.3 % (ref 37–47)
HCV AB SER QL: NORMAL
HDLC SERPL-MCNC: 72 MG/DL
HGB BLD-MCNC: 14.3 G/DL (ref 12–16)
IMM GRANULOCYTES # BLD AUTO: 0.01 K/UL (ref 0–0.11)
IMM GRANULOCYTES NFR BLD AUTO: 0.2 % (ref 0–0.9)
LDLC SERPL CALC-MCNC: 96 MG/DL
LYMPHOCYTES # BLD AUTO: 2.24 K/UL (ref 1–4.8)
LYMPHOCYTES NFR BLD: 35.6 % (ref 22–41)
MCH RBC QN AUTO: 29.7 PG (ref 27–33)
MCHC RBC AUTO-ENTMCNC: 33 G/DL (ref 33.6–35)
MCV RBC AUTO: 89.8 FL (ref 81.4–97.8)
MONOCYTES # BLD AUTO: 0.51 K/UL (ref 0–0.85)
MONOCYTES NFR BLD AUTO: 8.1 % (ref 0–13.4)
NEUTROPHILS # BLD AUTO: 3.32 K/UL (ref 2–7.15)
NEUTROPHILS NFR BLD: 52.7 % (ref 44–72)
NRBC # BLD AUTO: 0 K/UL
NRBC BLD-RTO: 0 /100 WBC
PLATELET # BLD AUTO: 193 K/UL (ref 164–446)
PMV BLD AUTO: 11.8 FL (ref 9–12.9)
POTASSIUM SERPL-SCNC: 5 MMOL/L (ref 3.6–5.5)
PROT SERPL-MCNC: 8 G/DL (ref 6–8.2)
RBC # BLD AUTO: 4.82 M/UL (ref 4.2–5.4)
SODIUM SERPL-SCNC: 136 MMOL/L (ref 135–145)
TRIGL SERPL-MCNC: 76 MG/DL (ref 0–149)
TSH SERPL DL<=0.005 MIU/L-ACNC: 1.07 UIU/ML (ref 0.38–5.33)
WBC # BLD AUTO: 6.3 K/UL (ref 4.8–10.8)

## 2020-04-04 PROCEDURE — 86803 HEPATITIS C AB TEST: CPT

## 2020-04-04 PROCEDURE — 80053 COMPREHEN METABOLIC PANEL: CPT

## 2020-04-04 PROCEDURE — 85025 COMPLETE CBC W/AUTO DIFF WBC: CPT

## 2020-04-04 PROCEDURE — 80061 LIPID PANEL: CPT

## 2020-04-04 PROCEDURE — 84443 ASSAY THYROID STIM HORMONE: CPT

## 2020-04-04 PROCEDURE — 82306 VITAMIN D 25 HYDROXY: CPT

## 2020-04-04 PROCEDURE — 36415 COLL VENOUS BLD VENIPUNCTURE: CPT

## 2020-05-19 DIAGNOSIS — E03.9 HYPOTHYROIDISM, UNSPECIFIED TYPE: ICD-10-CM

## 2020-05-19 DIAGNOSIS — I10 ESSENTIAL HYPERTENSION: ICD-10-CM

## 2020-05-19 RX ORDER — LISINOPRIL 10 MG/1
10 TABLET ORAL DAILY
Qty: 90 TAB | Refills: 0 | Status: SHIPPED | OUTPATIENT
Start: 2020-05-19 | End: 2020-08-17 | Stop reason: SDUPTHER

## 2020-05-19 RX ORDER — LEVOTHYROXINE SODIUM 0.12 MG/1
125 TABLET ORAL DAILY
Qty: 90 TAB | Refills: 1 | Status: SHIPPED | OUTPATIENT
Start: 2020-05-19 | End: 2020-08-17 | Stop reason: SDUPTHER

## 2020-06-01 ENCOUNTER — HOSPITAL ENCOUNTER (OUTPATIENT)
Dept: RADIOLOGY | Facility: MEDICAL CENTER | Age: 65
End: 2020-06-01
Attending: INTERNAL MEDICINE
Payer: COMMERCIAL

## 2020-06-01 DIAGNOSIS — Z12.31 ENCOUNTER FOR SCREENING MAMMOGRAM FOR MALIGNANT NEOPLASM OF BREAST: ICD-10-CM

## 2020-06-01 DIAGNOSIS — Z00.00 PREVENTATIVE HEALTH CARE: ICD-10-CM

## 2020-06-01 PROCEDURE — 77067 SCR MAMMO BI INCL CAD: CPT

## 2020-06-15 DIAGNOSIS — R19.5 POSITIVE COLORECTAL CANCER SCREENING USING COLOGUARD TEST: ICD-10-CM

## 2020-08-15 ENCOUNTER — PATIENT MESSAGE (OUTPATIENT)
Dept: MEDICAL GROUP | Facility: MEDICAL CENTER | Age: 65
End: 2020-08-15

## 2020-08-15 DIAGNOSIS — E03.9 HYPOTHYROIDISM, UNSPECIFIED TYPE: ICD-10-CM

## 2020-08-15 DIAGNOSIS — I10 ESSENTIAL HYPERTENSION: ICD-10-CM

## 2020-08-17 RX ORDER — LEVOTHYROXINE SODIUM 0.12 MG/1
125 TABLET ORAL DAILY
Qty: 90 TAB | Refills: 1 | Status: SHIPPED | OUTPATIENT
Start: 2020-08-17 | End: 2021-02-22

## 2020-08-17 RX ORDER — LISINOPRIL 10 MG/1
10 TABLET ORAL DAILY
Qty: 90 TAB | Refills: 1 | Status: SHIPPED | OUTPATIENT
Start: 2020-08-17 | End: 2020-12-31 | Stop reason: SDUPTHER

## 2020-08-17 NOTE — PATIENT COMMUNICATION
Received request via: Patient    Was the patient seen in the last year in this department? Yes    Does the patient have an active prescription (recently filled or refills available) for medication(s) requested? No     Requested Prescriptions     Pending Prescriptions Disp Refills   • lisinopril (PRINIVIL) 10 MG Tab 90 Tab      Sig: Take 1 Tab by mouth every day.   • levothyroxine (SYNTHROID) 125 MCG Tab 90 Tab      Sig: Take 1 Tab by mouth every day.

## 2020-12-31 ENCOUNTER — PATIENT MESSAGE (OUTPATIENT)
Dept: MEDICAL GROUP | Facility: MEDICAL CENTER | Age: 65
End: 2020-12-31

## 2020-12-31 DIAGNOSIS — I10 ESSENTIAL HYPERTENSION: ICD-10-CM

## 2020-12-31 RX ORDER — LISINOPRIL 10 MG/1
10 TABLET ORAL DAILY
Qty: 90 TAB | Refills: 1 | Status: SHIPPED | OUTPATIENT
Start: 2020-12-31 | End: 2021-03-08 | Stop reason: SDUPTHER

## 2021-03-08 ENCOUNTER — OFFICE VISIT (OUTPATIENT)
Dept: MEDICAL GROUP | Facility: MEDICAL CENTER | Age: 66
End: 2021-03-08
Payer: COMMERCIAL

## 2021-03-08 VITALS
WEIGHT: 151.9 LBS | DIASTOLIC BLOOD PRESSURE: 72 MMHG | OXYGEN SATURATION: 97 % | TEMPERATURE: 98.9 F | SYSTOLIC BLOOD PRESSURE: 134 MMHG | HEART RATE: 78 BPM | BODY MASS INDEX: 25.31 KG/M2 | RESPIRATION RATE: 18 BRPM | HEIGHT: 65 IN

## 2021-03-08 DIAGNOSIS — R09.89 DECREASED PULSES IN FEET: ICD-10-CM

## 2021-03-08 DIAGNOSIS — M19.90 OSTEOARTHRITIS, UNSPECIFIED OSTEOARTHRITIS TYPE, UNSPECIFIED SITE: ICD-10-CM

## 2021-03-08 DIAGNOSIS — E03.9 HYPOTHYROIDISM, UNSPECIFIED TYPE: ICD-10-CM

## 2021-03-08 DIAGNOSIS — I10 ESSENTIAL HYPERTENSION: ICD-10-CM

## 2021-03-08 DIAGNOSIS — Z23 NEED FOR VACCINATION: ICD-10-CM

## 2021-03-08 DIAGNOSIS — Z00.00 PREVENTATIVE HEALTH CARE: ICD-10-CM

## 2021-03-08 DIAGNOSIS — R01.1 MURMUR, CARDIAC: ICD-10-CM

## 2021-03-08 DIAGNOSIS — M62.838 MUSCLE SPASM: ICD-10-CM

## 2021-03-08 DIAGNOSIS — E55.9 VITAMIN D DEFICIENCY: ICD-10-CM

## 2021-03-08 DIAGNOSIS — Z12.31 ENCOUNTER FOR SCREENING MAMMOGRAM FOR MALIGNANT NEOPLASM OF BREAST: ICD-10-CM

## 2021-03-08 DIAGNOSIS — F17.200 NICOTINE DEPENDENCE, UNCOMPLICATED, UNSPECIFIED NICOTINE PRODUCT TYPE: ICD-10-CM

## 2021-03-08 DIAGNOSIS — R25.2 SPASM: ICD-10-CM

## 2021-03-08 PROBLEM — Z79.890 HORMONE REPLACEMENT THERAPY: Status: RESOLVED | Noted: 2018-04-06 | Resolved: 2021-03-08

## 2021-03-08 PROCEDURE — 90471 IMMUNIZATION ADMIN: CPT | Performed by: INTERNAL MEDICINE

## 2021-03-08 PROCEDURE — 99396 PREV VISIT EST AGE 40-64: CPT | Mod: 25 | Performed by: INTERNAL MEDICINE

## 2021-03-08 PROCEDURE — 90732 PPSV23 VACC 2 YRS+ SUBQ/IM: CPT | Performed by: INTERNAL MEDICINE

## 2021-03-08 RX ORDER — CYCLOBENZAPRINE HCL 10 MG
10 TABLET ORAL 3 TIMES DAILY PRN
Qty: 30 TABLET | Refills: 3 | Status: SHIPPED | OUTPATIENT
Start: 2021-03-08

## 2021-03-08 RX ORDER — LEVOTHYROXINE SODIUM 0.12 MG/1
125 TABLET ORAL
Qty: 90 TABLET | Refills: 1 | Status: SHIPPED | OUTPATIENT
Start: 2021-03-08 | End: 2021-03-17

## 2021-03-08 RX ORDER — LISINOPRIL 10 MG/1
10 TABLET ORAL DAILY
Qty: 90 TABLET | Refills: 1 | Status: SHIPPED | OUTPATIENT
Start: 2021-03-08 | End: 2021-06-10 | Stop reason: SDUPTHER

## 2021-03-08 ASSESSMENT — FIBROSIS 4 INDEX: FIB4 SCORE: 1.95

## 2021-03-08 ASSESSMENT — PATIENT HEALTH QUESTIONNAIRE - PHQ9: CLINICAL INTERPRETATION OF PHQ2 SCORE: 0

## 2021-03-09 NOTE — PROGRESS NOTES
CC:  Diagnoses of Essential hypertension, Hypothyroidism, unspecified type, Vitamin D deficiency disease, Preventative health care, Encounter for screening mammogram for malignant neoplasm of breast, Osteoarthritis, unspecified osteoarthritis type, unspecified site, Spasm, Muscle spasm, Need for vaccination, Murmur, cardiac, Nicotine dependence, uncomplicated, unspecified nicotine product type, and Decreased pulses in feet were pertinent to this visit.    HISTORY OF THE PRESENT ILLNESS: Patient is a 65 y.o. female. This pleasant patient is here today she says for annual wellness visit.    Will be due for mammogram again in June, no current breast concerns.    She did successfully complete her colonoscopy 7/28/2020 there was small polyp a 3 mm, angiectasia at the cecum, ultimately GI recommended 10-year follow-up.    Still smoking has weaned down to 5/day, sounds habitual before and after work.  She is not ready to quit and she will let me know if she would like assistance.    She noticed about a month ago that her first left toe starts to get purple in color.  When she wakes up after sleeping in a warm bed the toe is normal color, seems to just happen when exposed to cold air.  She denies any claudication symptoms.  Not had hair on her lower legs for many years.  Prior lipid panel was normal.    On exam continues to have a cardiac murmur, no cardiopulmonary or strokelike symptoms.  She will consider inquiring with her insurance if echocardiogram would be covered.    She did successfully wean herself down and off estrogen replacement and she feels fine, no hot flashes.    States needs refill of Flexeril she takes it at night as needed for muscle spasms which can happen after a long day at work.  No new neurologic symptoms.  She does not wish to have change in therapy as she says she takes a Flexeril maybe once or twice per week and she knows it is sedating do not mix with alcohol, other nonprescribed sedating drugs,  "driving or machinery.    Allergies: Patient has no known allergies.    Current Outpatient Medications Ordered in Epic   Medication Sig Dispense Refill   • cyclobenzaprine (FLEXERIL) 10 mg Tab Take 1 tablet by mouth 3 times a day as needed. 30 tablet 3   • lisinopril (PRINIVIL) 10 MG Tab Take 1 tablet by mouth every day. 90 tablet 1   • levothyroxine (SYNTHROID) 125 MCG Tab Take 1 tablet by mouth every day. 90 tablet 1   • Cholecalciferol (VITAMIN D) 2000 UNITS CAPS Take 1 Cap by mouth every day. (Patient not taking: Reported on 3/8/2021) 111 Cap 11     No current [facility]-ordered facility-administered medications on file.       Past Medical History:   Diagnosis Date   • Hormone replacement therapy 4/6/2018   • HTN (hypertension) 12/23/2013   • Hypertension    • Hypothyroidism        Past Surgical History:   Procedure Laterality Date   • APPENDECTOMY  10/2015   • ABDOMINAL HYSTERECTOMY TOTAL  1992    endometriosis   • OTHER      birthmark removal       Social History     Tobacco Use   • Smoking status: Current Every Day Smoker     Packs/day: 0.25     Years: 30.00     Pack years: 7.50   • Smokeless tobacco: Never Used   • Tobacco comment: working on cutting down smoking    Substance Use Topics   • Alcohol use: Yes     Comment: rarely   • Drug use: No       Social History     Social History Narrative    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    General: Normal appearing. No distress.  EYES: Conjunctiva clear lids without ptosis, pupils equal  EARS: Normal shape and contour   Pulmonary: Clear to ausculation.  Normal effort. No rales or " wheezing.  Cardiovascular: Regular rate and rhythm with systolic ejection murmur approximately 2 out of 6 at left sternal border  Abdomen: Soft, nontender, nondistended. Normal bowel sounds.  Neurologic: Cranial nerves grossly nonfocal  Skin: Warm and dry.  No obvious lesions.  Musculoskeletal: Normal gait.  No edema of the lower extremities.  No hair on lower legs.  Purple discoloration left first toe of foot which does leonardo with pressure.  Capillary refill little over 3 seconds.  Distal pedal and posterior tibial pulses 1+.  Psych: Normal mood and affect. Alert and oriented x3. Judgment and insight is normal.      Assessment/Plan  1. Essential hypertension  Blood pressure is reasonably controlled continue lisinopril 10 mg daily  - TSH; Future  - FREE THYROXINE; Future  - CBC WITHOUT DIFFERENTIAL; Future  - Comp Metabolic Panel; Future  - lisinopril (PRINIVIL) 10 MG Tab; Take 1 tablet by mouth every day.  Dispense: 90 tablet; Refill: 1    2. Hypothyroidism, unspecified type  Plan to reassess status of her thyroid, continue levothyroxine.  - TSH; Future  - FREE THYROXINE; Future  - levothyroxine (SYNTHROID) 125 MCG Tab; Take 1 tablet by mouth every day.  Dispense: 90 tablet; Refill: 1    3. Vitamin D deficiency disease  Plan to reassess status and continue cholecalciferol.  - VITAMIN D,25 HYDROXY; Future    4. Preventative health care  - TSH; Future  - FREE THYROXINE; Future  - Lipid Profile; Future  - CBC WITHOUT DIFFERENTIAL; Future  - Comp Metabolic Panel; Future  - VITAMIN D,25 HYDROXY; Future    5. Encounter for screening mammogram for malignant neoplasm of breast  Due for screening in June  - MA-SCREENING MAMMO BILAT W/TOMOSYNTHESIS W/CAD; Future    6. Osteoarthritis, unspecified osteoarthritis type, unspecified site  We will continue Flexeril with sedation warning.  - cyclobenzaprine (FLEXERIL) 10 mg Tab; Take 1 tablet by mouth 3 times a day as needed.  Dispense: 30 tablet; Refill: 3    7. Spasm  See #6  above  - cyclobenzaprine (FLEXERIL) 10 mg Tab; Take 1 tablet by mouth 3 times a day as needed.  Dispense: 30 tablet; Refill: 3    8. Muscle spasm  See #6 above    9. Need for vaccination  Return for shingles vaccine  - Pneumococal Polysaccharide Vaccine 23-Valent =>1YO SQ/IM    10. Murmur, cardiac  - EC-ECHOCARDIOGRAM COMPLETE W/O CONT; Future    11. Nicotine dependence, uncomplicated, unspecified nicotine product type  She will let me know when she is ready to quit at this time she declines pharmacotherapy.  Recommend changing behaviors as she seems to smoke more out of habit.  - US-EXTREMITY ARTERY LOWER BILAT W/MADI (COMBO); Future    12. Decreased pulses in feet  Pulses minimally diminished in foot.  Though she does have claudication symptoms on ambulation.  Lipid panel previously looked good but her 10-year ASCVD risk is elevated based on risk factors which include smoking hypertension--patient prefers to reassess labs and obtain the MADI before considering statin.  Overall the description of the toe change in color, painless, occurring in a cold environment sounds more like a Raynaud's.  Though with her smoking history, decreased pulses and lack of hair on lower legs I feel reasonable to get MADI study to define any peripheral arterial disease.  - US-EXTREMITY ARTERY LOWER BILAT W/MADI (COMBO); Future      Patient prefers annual follow-up        Please note that this dictation was created using voice recognition software. I have made every reasonable attempt to correct obvious errors, but I expect that there are errors of grammar and possibly content that I did not discover before finalizing the note.

## 2021-03-12 ENCOUNTER — HOSPITAL ENCOUNTER (OUTPATIENT)
Dept: LAB | Facility: MEDICAL CENTER | Age: 66
End: 2021-03-12
Attending: INTERNAL MEDICINE
Payer: COMMERCIAL

## 2021-03-12 DIAGNOSIS — Z00.00 PREVENTATIVE HEALTH CARE: ICD-10-CM

## 2021-03-12 DIAGNOSIS — E55.9 VITAMIN D DEFICIENCY: ICD-10-CM

## 2021-03-12 DIAGNOSIS — E03.9 HYPOTHYROIDISM, UNSPECIFIED TYPE: ICD-10-CM

## 2021-03-12 DIAGNOSIS — I10 ESSENTIAL HYPERTENSION: ICD-10-CM

## 2021-03-12 LAB
25(OH)D3 SERPL-MCNC: 38 NG/ML (ref 30–100)
ALBUMIN SERPL BCP-MCNC: 4.2 G/DL (ref 3.2–4.9)
ALBUMIN/GLOB SERPL: 1.2 G/DL
ALP SERPL-CCNC: 95 U/L (ref 30–99)
ALT SERPL-CCNC: 13 U/L (ref 2–50)
ANION GAP SERPL CALC-SCNC: 11 MMOL/L (ref 7–16)
AST SERPL-CCNC: 22 U/L (ref 12–45)
BILIRUB SERPL-MCNC: 0.5 MG/DL (ref 0.1–1.5)
BUN SERPL-MCNC: 16 MG/DL (ref 8–22)
CALCIUM SERPL-MCNC: 9.9 MG/DL (ref 8.5–10.5)
CHLORIDE SERPL-SCNC: 102 MMOL/L (ref 96–112)
CHOLEST SERPL-MCNC: 168 MG/DL (ref 100–199)
CO2 SERPL-SCNC: 24 MMOL/L (ref 20–33)
CREAT SERPL-MCNC: 0.73 MG/DL (ref 0.5–1.4)
ERYTHROCYTE [DISTWIDTH] IN BLOOD BY AUTOMATED COUNT: 44.3 FL (ref 35.9–50)
FASTING STATUS PATIENT QL REPORTED: NORMAL
GLOBULIN SER CALC-MCNC: 3.5 G/DL (ref 1.9–3.5)
GLUCOSE SERPL-MCNC: 103 MG/DL (ref 65–99)
HCT VFR BLD AUTO: 46 % (ref 37–47)
HDLC SERPL-MCNC: 67 MG/DL
HGB BLD-MCNC: 14.7 G/DL (ref 12–16)
LDLC SERPL CALC-MCNC: 87 MG/DL
MCH RBC QN AUTO: 28.9 PG (ref 27–33)
MCHC RBC AUTO-ENTMCNC: 32 G/DL (ref 33.6–35)
MCV RBC AUTO: 90.6 FL (ref 81.4–97.8)
PLATELET # BLD AUTO: 210 K/UL (ref 164–446)
PMV BLD AUTO: 12.2 FL (ref 9–12.9)
POTASSIUM SERPL-SCNC: 4.8 MMOL/L (ref 3.6–5.5)
PROT SERPL-MCNC: 7.7 G/DL (ref 6–8.2)
RBC # BLD AUTO: 5.08 M/UL (ref 4.2–5.4)
SODIUM SERPL-SCNC: 137 MMOL/L (ref 135–145)
T4 FREE SERPL-MCNC: 2.21 NG/DL (ref 0.93–1.7)
TRIGL SERPL-MCNC: 71 MG/DL (ref 0–149)
TSH SERPL DL<=0.005 MIU/L-ACNC: 0.09 UIU/ML (ref 0.38–5.33)
WBC # BLD AUTO: 8.1 K/UL (ref 4.8–10.8)

## 2021-03-12 PROCEDURE — 82306 VITAMIN D 25 HYDROXY: CPT

## 2021-03-12 PROCEDURE — 85027 COMPLETE CBC AUTOMATED: CPT

## 2021-03-12 PROCEDURE — 84443 ASSAY THYROID STIM HORMONE: CPT

## 2021-03-12 PROCEDURE — 80053 COMPREHEN METABOLIC PANEL: CPT

## 2021-03-12 PROCEDURE — 80061 LIPID PANEL: CPT

## 2021-03-12 PROCEDURE — 36415 COLL VENOUS BLD VENIPUNCTURE: CPT

## 2021-03-12 PROCEDURE — 84439 ASSAY OF FREE THYROXINE: CPT

## 2021-03-15 ENCOUNTER — PATIENT MESSAGE (OUTPATIENT)
Dept: MEDICAL GROUP | Facility: MEDICAL CENTER | Age: 66
End: 2021-03-15

## 2021-03-15 NOTE — TELEPHONE ENCOUNTER
----- Message from Agatha Crystal M.D. sent at 3/14/2021  5:57 PM PDT -----  Please make appointment to discuss thyroid

## 2021-03-15 NOTE — PATIENT COMMUNICATION
Message left for patient to call [phone] to schedule an apt to discuss labs per PCP request.

## 2021-03-15 NOTE — PATIENT COMMUNICATION
Attempt # 1 called no answer. Left voicemail for patient to call [phone] to make an apt. Also sent a [employer] message.

## 2021-03-17 ENCOUNTER — TELEMEDICINE (OUTPATIENT)
Dept: MEDICAL GROUP | Facility: MEDICAL CENTER | Age: 66
End: 2021-03-17
Payer: COMMERCIAL

## 2021-03-17 VITALS — WEIGHT: 146 LBS | BODY MASS INDEX: 24.32 KG/M2 | HEIGHT: 65 IN

## 2021-03-17 DIAGNOSIS — E03.9 HYPOTHYROIDISM, UNSPECIFIED TYPE: ICD-10-CM

## 2021-03-17 DIAGNOSIS — Z91.89 FRAMINGHAM CARDIAC RISK 10-20% IN NEXT 10 YEARS: ICD-10-CM

## 2021-03-17 DIAGNOSIS — E78.5 DYSLIPIDEMIA: ICD-10-CM

## 2021-03-17 DIAGNOSIS — R73.01 IMPAIRED FASTING GLUCOSE: ICD-10-CM

## 2021-03-17 DIAGNOSIS — F17.200 NICOTINE DEPENDENCE, UNCOMPLICATED, UNSPECIFIED NICOTINE PRODUCT TYPE: ICD-10-CM

## 2021-03-17 PROCEDURE — 99214 OFFICE O/P EST MOD 30 MIN: CPT | Mod: 95,CR | Performed by: INTERNAL MEDICINE

## 2021-03-17 RX ORDER — CHLORHEXIDINE GLUCONATE ORAL RINSE 1.2 MG/ML
1 SOLUTION DENTAL PRN
COMMUNITY
Start: 2021-03-15

## 2021-03-17 RX ORDER — AMOXICILLIN 500 MG/1
1 CAPSULE ORAL 3 TIMES DAILY
COMMUNITY
Start: 2021-03-15

## 2021-03-17 RX ORDER — ATORVASTATIN CALCIUM 10 MG/1
10 TABLET, FILM COATED ORAL DAILY
Qty: 30 TABLET | Refills: 6 | Status: SHIPPED | OUTPATIENT
Start: 2021-03-17

## 2021-03-17 RX ORDER — LEVOTHYROXINE SODIUM 112 UG/1
112 TABLET ORAL
Qty: 90 TABLET | Refills: 0 | Status: SHIPPED | OUTPATIENT
Start: 2021-03-17 | End: 2021-06-10

## 2021-03-17 ASSESSMENT — FIBROSIS 4 INDEX: FIB4 SCORE: 1.89

## 2021-03-17 NOTE — PROGRESS NOTES
Telemedicine: Established Patient   This evaluation was conducted via Zoom using secure and encrypted videoconferencing technology. The patient was in a private location in the state of [place].    The patient's identity was confirmed and verbal consent was obtained for this virtual visit.    Subjective:   CC:   Chief Complaint   Patient presents with   • Follow-Up     thyroid       Flaca Menard is a 66 y.o. female presenting for evaluation and management of:    Follow-up labs which were dated 3/12/2021.    TSH suppressed 0.090, T4 high 2.2 she denies any palpitations, tremor, hair loss, irritability, lack of sleep, etc.  Compliant with her levothyroxine 125 mg daily.    Additional labs show CBC reasonable, vitamin D normal 38, CMP notable for glucose of 103, lipids total 168, triglycerides 71, HDL 67 LDL 87.    She does understand her 10-year ASCVD risk was high at 12.4%.  She may be becoming prediabetic/diabetic, she feels that there is a lot of changes she can make in her diet.  She will consider starting baby aspirin.  She is interested in starting statin.  She is going to work diligently on tobacco cessation she says she has nicotine lozenges already at home.  Does say she has family history diabetes in mother and maternal uncle.  No cardiopulmonary or strokelike symptoms at rest or exertion.    Many of the studies ordered last visit are pending to be done on 5/5/2021 including echo, MADI, mammogram per patient.    Recently had dental implant she is on amoxicillin and Peridex mouthwash as well as as needed ibuprofen until this heals over the next few days.    ROS      No Known Allergies    Current medicines (including changes today)  Current Outpatient Medications   Medication Sig Dispense Refill   • Calcium Carbonate-Vitamin D (CALCIUM 500 + D PO) Take  by mouth.     • atorvastatin (LIPITOR) 10 MG Tab Take 1 tablet by mouth every day. 30 tablet 6   • levothyroxine (SYNTHROID) 112 MCG Tab Take 1 tablet by  "mouth Every morning on an empty stomach. 90 tablet 0   • cyclobenzaprine (FLEXERIL) 10 mg Tab Take 1 tablet by mouth 3 times a day as needed. 30 tablet 3   • lisinopril (PRINIVIL) 10 MG Tab Take 1 tablet by mouth every day. 90 tablet 1   • amoxicillin (AMOXIL) 500 MG Cap Take 1 capsule by mouth 3 times a day.     • chlorhexidine (PERIDEX) 0.12 % Solution Take 1 mL by mouth as needed.     • Cholecalciferol (VITAMIN D) 2000 UNITS CAPS Take 1 Cap by mouth every day. (Patient not taking: Reported on 3/8/2021) 111 Cap 11     No current facility-administered medications for this visit.       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  She  has a past surgical history that includes abdominal hysterectomy total (1992); other; and appendectomy (10/2015).       Objective:   Ht 1.651 m (5' 5\")   Wt 66.2 kg (146 lb)   BMI 24.30 kg/m²     Physical Exam  No acute distress  Breathing comfortably  No visible rash, focal asymmetry or swelling  Assessment and Plan:   The following treatment plan was discussed:     1. Hypothyroidism, unspecified type  - TSH; Future  - FREE THYROXINE; Future  - Comp Metabolic Panel; Future    2. Nicotine dependence, uncomplicated, unspecified nicotine product type  - CT-CARDIAC SCORING; Future    3. [place] cardiac risk 10-20% in next 10 years  - CT-CARDIAC SCORING; Future  - atorvastatin (LIPITOR) 10 MG Tab; Take 1 tablet by mouth every day.  Dispense: 30 " tablet; Refill: 6  - Lipid Profile; Future    4. Impaired fasting glucose  - HEMOGLOBIN A1C; Future  - Comp Metabolic Panel; Future    5. Dyslipidemia  - Lipid Profile; Future    Other orders  - Calcium Carbonate-Vitamin D (CALCIUM 500 + D PO); Take  by mouth.  - amoxicillin (AMOXIL) 500 MG Cap; Take 1 capsule by mouth 3 times a day.  - chlorhexidine (PERIDEX) 0.12 % Solution; Take 1 mL by mouth as needed.  - levothyroxine (SYNTHROID) 112 MCG Tab; Take 1 tablet by mouth Every morning on an empty stomach.  Dispense: 90 tablet; Refill: 0    Plan to decrease levothyroxine 125 down to 112 mcg daily and reassess 6 to 8 weeks.    She is agreeable to start statin as she has elevated ASCVD risk with multiple risk factors for cardiac disease, currently asymptomatic.  She will stop the statin if she develops any ADR including myalgias and let me know.  We will consider if she wants cardiac CT to further define her coronary artery plaque.    She will fix her diet try to exercise regularly for history impaired fasting glucose, cardiovascular risk, etc.    Patient prefers nicotine lozenges to try to quit tobacco will reassess    Follow-up: Return in about 10 weeks (around 5/26/2021).

## 2021-04-23 ENCOUNTER — HOSPITAL ENCOUNTER (OUTPATIENT)
Dept: LAB | Facility: MEDICAL CENTER | Age: 66
End: 2021-04-23
Attending: INTERNAL MEDICINE
Payer: COMMERCIAL

## 2021-04-23 DIAGNOSIS — E03.9 HYPOTHYROIDISM, UNSPECIFIED TYPE: ICD-10-CM

## 2021-04-23 DIAGNOSIS — Z91.89 FRAMINGHAM CARDIAC RISK 10-20% IN NEXT 10 YEARS: ICD-10-CM

## 2021-04-23 DIAGNOSIS — E78.5 DYSLIPIDEMIA: ICD-10-CM

## 2021-04-23 DIAGNOSIS — R73.01 IMPAIRED FASTING GLUCOSE: ICD-10-CM

## 2021-04-23 LAB
ALBUMIN SERPL BCP-MCNC: 4.2 G/DL (ref 3.2–4.9)
ALBUMIN/GLOB SERPL: 1.2 G/DL
ALP SERPL-CCNC: 95 U/L (ref 30–99)
ALT SERPL-CCNC: 16 U/L (ref 2–50)
ANION GAP SERPL CALC-SCNC: 8 MMOL/L (ref 7–16)
AST SERPL-CCNC: 21 U/L (ref 12–45)
BILIRUB SERPL-MCNC: 0.6 MG/DL (ref 0.1–1.5)
BUN SERPL-MCNC: 19 MG/DL (ref 8–22)
CALCIUM SERPL-MCNC: 9.6 MG/DL (ref 8.5–10.5)
CHLORIDE SERPL-SCNC: 99 MMOL/L (ref 96–112)
CO2 SERPL-SCNC: 25 MMOL/L (ref 20–33)
CREAT SERPL-MCNC: 0.9 MG/DL (ref 0.5–1.4)
FASTING STATUS PATIENT QL REPORTED: NORMAL
GLOBULIN SER CALC-MCNC: 3.6 G/DL (ref 1.9–3.5)
GLUCOSE SERPL-MCNC: 132 MG/DL (ref 65–99)
POTASSIUM SERPL-SCNC: 4.2 MMOL/L (ref 3.6–5.5)
PROT SERPL-MCNC: 7.8 G/DL (ref 6–8.2)
SODIUM SERPL-SCNC: 132 MMOL/L (ref 135–145)

## 2021-04-23 PROCEDURE — 84443 ASSAY THYROID STIM HORMONE: CPT

## 2021-04-23 PROCEDURE — 83036 HEMOGLOBIN GLYCOSYLATED A1C: CPT

## 2021-04-23 PROCEDURE — 36415 COLL VENOUS BLD VENIPUNCTURE: CPT

## 2021-04-23 PROCEDURE — 84439 ASSAY OF FREE THYROXINE: CPT

## 2021-04-23 PROCEDURE — 80053 COMPREHEN METABOLIC PANEL: CPT

## 2021-04-24 LAB
EST. AVERAGE GLUCOSE BLD GHB EST-MCNC: 126 MG/DL
HBA1C MFR BLD: 6 % (ref 4–5.6)
T4 FREE SERPL-MCNC: 1.68 NG/DL (ref 0.93–1.7)
TSH SERPL DL<=0.005 MIU/L-ACNC: 0.33 UIU/ML (ref 0.38–5.33)

## 2021-04-26 ENCOUNTER — PATIENT MESSAGE (OUTPATIENT)
Dept: MEDICAL GROUP | Facility: MEDICAL CENTER | Age: 66
End: 2021-04-26

## 2021-04-26 DIAGNOSIS — R73.09 ELEVATED GLUCOSE: ICD-10-CM

## 2021-05-05 ENCOUNTER — HOSPITAL ENCOUNTER (OUTPATIENT)
Dept: CARDIOLOGY | Facility: MEDICAL CENTER | Age: 66
End: 2021-05-05
Attending: INTERNAL MEDICINE
Payer: COMMERCIAL

## 2021-05-05 ENCOUNTER — HOSPITAL ENCOUNTER (OUTPATIENT)
Dept: RADIOLOGY | Facility: MEDICAL CENTER | Age: 66
End: 2021-05-05
Attending: INTERNAL MEDICINE
Payer: COMMERCIAL

## 2021-05-05 DIAGNOSIS — R01.1 MURMUR, CARDIAC: ICD-10-CM

## 2021-05-05 DIAGNOSIS — F17.200 NICOTINE DEPENDENCE, UNCOMPLICATED, UNSPECIFIED NICOTINE PRODUCT TYPE: ICD-10-CM

## 2021-05-05 DIAGNOSIS — R09.89 DECREASED PULSES IN FEET: ICD-10-CM

## 2021-05-05 LAB
LV EJECT FRACT  99904: 70
LV EJECT FRACT MOD 2C 99903: 66.19
LV EJECT FRACT MOD 4C 99902: 72.17
LV EJECT FRACT MOD BP 99901: 69.69

## 2021-05-05 PROCEDURE — 93926 LOWER EXTREMITY STUDY: CPT | Mod: RT

## 2021-05-05 PROCEDURE — 93306 TTE W/DOPPLER COMPLETE: CPT

## 2021-05-05 PROCEDURE — 93922 UPR/L XTREMITY ART 2 LEVELS: CPT

## 2021-05-05 PROCEDURE — 93922 UPR/L XTREMITY ART 2 LEVELS: CPT | Mod: 26 | Performed by: INTERNAL MEDICINE

## 2021-05-05 PROCEDURE — 93306 TTE W/DOPPLER COMPLETE: CPT | Mod: 26 | Performed by: INTERNAL MEDICINE

## 2021-05-05 PROCEDURE — 93926 LOWER EXTREMITY STUDY: CPT | Mod: 26 | Performed by: INTERNAL MEDICINE

## 2021-05-13 ENCOUNTER — HOSPITAL ENCOUNTER (OUTPATIENT)
Dept: LAB | Facility: MEDICAL CENTER | Age: 66
End: 2021-05-13
Attending: INTERNAL MEDICINE
Payer: COMMERCIAL

## 2021-05-13 DIAGNOSIS — R73.09 ELEVATED GLUCOSE: ICD-10-CM

## 2021-05-13 LAB — GLUCOSE SERPL-MCNC: 99 MG/DL (ref 65–99)

## 2021-05-13 PROCEDURE — 36415 COLL VENOUS BLD VENIPUNCTURE: CPT

## 2021-05-13 PROCEDURE — 82947 ASSAY GLUCOSE BLOOD QUANT: CPT

## 2021-05-19 ENCOUNTER — PATIENT MESSAGE (OUTPATIENT)
Dept: MEDICAL GROUP | Facility: MEDICAL CENTER | Age: 66
End: 2021-05-19

## 2021-05-19 DIAGNOSIS — H93.19 TINNITUS, UNSPECIFIED LATERALITY: ICD-10-CM

## 2021-05-26 ENCOUNTER — APPOINTMENT (OUTPATIENT)
Dept: MEDICAL GROUP | Facility: MEDICAL CENTER | Age: 66
End: 2021-05-26
Payer: COMMERCIAL

## 2021-06-10 ENCOUNTER — PATIENT MESSAGE (OUTPATIENT)
Dept: MEDICAL GROUP | Facility: MEDICAL CENTER | Age: 66
End: 2021-06-10

## 2021-06-10 DIAGNOSIS — I10 ESSENTIAL HYPERTENSION: ICD-10-CM

## 2021-06-10 RX ORDER — LEVOTHYROXINE SODIUM 112 UG/1
TABLET ORAL
Qty: 90 TABLET | Refills: 3 | Status: SHIPPED | OUTPATIENT
Start: 2021-06-10

## 2021-06-10 RX ORDER — LEVOTHYROXINE SODIUM 112 UG/1
TABLET ORAL
Qty: 90 TABLET | Refills: 0 | Status: SHIPPED | OUTPATIENT
Start: 2021-06-10 | End: 2021-06-10 | Stop reason: SDUPTHER

## 2021-06-10 RX ORDER — LISINOPRIL 10 MG/1
10 TABLET ORAL DAILY
Qty: 90 TABLET | Refills: 3 | Status: SHIPPED | OUTPATIENT
Start: 2021-06-10 | End: 2021-11-05 | Stop reason: SDUPTHER

## 2021-06-10 NOTE — PATIENT COMMUNICATION
Received request via: Patient    Was the patient seen in the last year in this department? Yes    Does the patient have an active prescription (recently filled or refills available) for medication(s) requested? Yes patient is requesting a year prescription

## 2021-07-01 NOTE — TELEPHONE ENCOUNTER
*CHANGE IN PHARMACY LOCATION, PT NEEDS TO MAKE APPT AND GET LABS UPDATED*  *MED WERE REQUESTED BY TWO [facility]*  Was the patient seen in the last year in this department? Yes    Does patient have an active prescription for medications requested? Yes    Received Request Via: Pharmacy      Pt met protocol?: NO    LAST OV 04/06/2018    BP Readings from Last 1 Encounters:   04/06/18 108/64     Lab Results   Component Value Date/Time    CHOLSTRLTOT 165 04/16/2018 06:02 AM    LDL 83 04/16/2018 06:02 AM    HDL 60 04/16/2018 06:02 AM    TRIGLYCERIDE 110 04/16/2018 06:02 AM       Lab Results   Component Value Date/Time    SODIUM 136 04/16/2018 06:02 AM    POTASSIUM 4.3 04/16/2018 06:02 AM    CHLORIDE 103 04/16/2018 06:02 AM    CO2 26 04/16/2018 06:02 AM    GLUCOSE 83 04/16/2018 06:02 AM    BUN 15 04/16/2018 06:02 AM    CREATININE 0.96 04/16/2018 06:02 AM     Lab Results   Component Value Date/Time    ALKPHOSPHAT 61 04/16/2018 06:02 AM    ASTSGOT 16 04/16/2018 06:02 AM    ALTSGPT 9 04/16/2018 06:02 AM    TBILIRUBIN 0.5 04/16/2018 06:02 AM            Nurse

## 2021-11-05 DIAGNOSIS — I10 ESSENTIAL HYPERTENSION: ICD-10-CM

## 2021-11-05 RX ORDER — LISINOPRIL 10 MG/1
10 TABLET ORAL DAILY
Qty: 90 TABLET | Refills: 3 | Status: SHIPPED | OUTPATIENT
Start: 2021-11-05